# Patient Record
Sex: MALE | Race: BLACK OR AFRICAN AMERICAN | NOT HISPANIC OR LATINO | Employment: FULL TIME | URBAN - METROPOLITAN AREA
[De-identification: names, ages, dates, MRNs, and addresses within clinical notes are randomized per-mention and may not be internally consistent; named-entity substitution may affect disease eponyms.]

---

## 2023-08-16 ENCOUNTER — HOSPITAL ENCOUNTER (EMERGENCY)
Facility: CLINIC | Age: 63
Discharge: HOME | End: 2023-08-16
Attending: EMERGENCY MEDICINE | Admitting: EMERGENCY MEDICINE
Payer: COMMERCIAL

## 2023-08-16 VITALS
SYSTOLIC BLOOD PRESSURE: 158 MMHG | DIASTOLIC BLOOD PRESSURE: 99 MMHG | HEART RATE: 67 BPM | RESPIRATION RATE: 18 BRPM | OXYGEN SATURATION: 98 % | BODY MASS INDEX: 24.38 KG/M2 | TEMPERATURE: 97.9 F | HEIGHT: 72 IN | WEIGHT: 180 LBS

## 2023-08-16 DIAGNOSIS — A53.9 SYPHILIS: ICD-10-CM

## 2023-08-16 PROCEDURE — 99284 EMERGENCY DEPT VISIT MOD MDM: CPT | Performed by: EMERGENCY MEDICINE

## 2023-08-16 PROCEDURE — 250N000011 HC RX IP 250 OP 636: Performed by: EMERGENCY MEDICINE

## 2023-08-16 PROCEDURE — 96372 THER/PROPH/DIAG INJ SC/IM: CPT | Performed by: EMERGENCY MEDICINE

## 2023-08-16 RX ADMIN — PENICILLIN G BENZATHINE 2.4 MILLION UNITS: 2400000 INJECTION, SUSPENSION INTRAMUSCULAR at 01:45

## 2023-08-16 ASSESSMENT — ACTIVITIES OF DAILY LIVING (ADL): ADLS_ACUITY_SCORE: 33

## 2023-08-16 NOTE — ED TRIAGE NOTES
Patient reported he was diagnosed with Syphilis in March 2022. He's supposed to received 3 antibiotic shots but he missed the last dose. Patient said he noticed the symptoms recurring. Pt c/o pain to right upper arm up to his armpit, Tingling to bilateral hand/fingers. Patient also reported non healing sores on bilateral legs. He said he went to Urgent care yesterday but he was told they were at capacity.      Triage Assessment       Row Name 08/16/23 0046       Triage Assessment (Adult)    Airway WDL WDL       Respiratory WDL    Respiratory WDL WDL       Skin Circulation/Temperature WDL    Skin Circulation/Temperature WDL X  pt reported not healing sores on bilateral leg       Cardiac WDL    Cardiac WDL WDL       Peripheral/Neurovascular WDL    Peripheral Neurovascular WDL X;neurovascular assessment upper       LUE Neurovascular Assessment    Temperature LUE warm    Color LUE no discoloration    Sensation LUE tingling present       RUE Neurovascular Assessment    Temperature RUE warm    Color RUE no discoloration    Sensation RUE tingling present       Cognitive/Neuro/Behavioral WDL    Cognitive/Neuro/Behavioral WDL WDL

## 2023-08-16 NOTE — ED PROVIDER NOTES
[facility] [place] ([facility])    8/16/23          History     Chief Complaint   Patient presents with    Exposure to STD     HPI  Ryan Ren is a 63 year old male who with PMH of emphysema, HTN, syphilis, prediabetes, and Stage 3b CKD presents to the ED for STD.   [facility]. He received penicillin shots initially but did not complete a 3-week treatment course.. Patient has been having recurring symptoms , he was advised to go to the ED to restart the 3-week series of penicillin. Patient has been having sores and aches in his arms.  Symptoms are similar when he had his previous diagnosis of syphilis  Past Medical History  Past Medical History:   Diagnosis Date    Syphilis      History reviewed. No pertinent surgical history.  No current outpatient medications on file.    No Known Allergies  Family History  History reviewed. No pertinent family history.  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 No additional pertinent items.      A complete review of systems was performed with pertinent positives and negatives noted in the HPI, and all other systems negative.    Physical Exam   BP: (!) 158/99  Pulse: 67  Temp: 97.9  F (36.6  C)  Resp: 18  Height: 182.9 cm (6')  Weight: 81.6 kg (180 lb)  SpO2: 98 %  Physical Exam  Vitals reviewed.   Constitutional:       General: He is not in acute distress.     Appearance: He is not diaphoretic.   HENT:      Head: Normocephalic and atraumatic.      Right Ear: External ear normal.      Left Ear: External ear normal.      Nose: Nose normal. No rhinorrhea.      Mouth/Throat:       Mouth: Mucous membranes are moist.   Eyes:      General: No scleral icterus.     Extraocular Movements: Extraocular movements intact.      Conjunctiva/sclera: Conjunctivae normal.   Cardiovascular:      Rate and Rhythm: Normal rate and regular rhythm.      Heart sounds: Normal heart sounds.   Pulmonary:      Effort: No respiratory distress.      Breath sounds: Normal breath sounds.   Abdominal:      General: Bowel sounds are normal.      Palpations: Abdomen is soft.      Tenderness: There is no abdominal tenderness.   Musculoskeletal:         General: No deformity. Normal range of motion.      Cervical back: Normal range of motion and neck supple.   Skin:     General: Skin is warm.      Findings: No rash.   Neurological:      Mental Status: Mental status is at baseline.   Psychiatric:         Mood and Affect: Mood normal.         Behavior: Behavior normal.       ED Course, Procedures, & Data    1:10 AM  The patient was seen and examined by Keny Soriano DO.  in Room ED 02.   Procedures             No results found for any visits on 08/16/23.  Medications   Penicillin G Benzathine (BICILLIN L-A) injection 2.4 Million Units (has no administration in time range)     Labs Ordered and Resulted from Time of ED Arrival to Time of ED Departure - No data to display  No orders to display          Medical Decision Making  The patient's presentation is strongly suggestive of moderate complexity (a chronic illness mild to moderate exacerbation, progression, or side effect of treatment).    The patient's evaluation involved:  review of external note(s) from 3+ sources (Most recent H&P in addition to clinic and ED note)  review of 2 test result(s) ordered prior to this encounter (Most recent BMP and CBC)    The patient's management involved moderate risk (prescription drug management including medications given in the ED).    Assessment & Plan    63-year-old male presents to us with chief complaint of concerns for  recurrent syphilis.  As he did not complete the antibiotic course that he started a year ago we will reinstitute it today.  Patient can follow-up then at primary care office for the additional doses of penicillin.    I have reviewed the nursing notes. I have reviewed the findings, diagnosis, plan and need for follow up with the patient.    New Prescriptions    No medications on file       Final diagnoses:   Syphilis   I, FRANK CHRISTINE, am serving as a trained medical scribe to document services personally performed by Keny Soriano DO, based on the provider's statements to me.     I, Keny Soriano DO, was physically present and have reviewed and verified the accuracy of this note documented by FRANK CHRISTINE.     Keny Soriano DO.   [facility] EMERGENCY DEPARTMENT  8/16/2023     Keny Soriano DO  08/16/23 0143

## 2023-08-16 NOTE — DISCHARGE INSTRUCTIONS
Follow-up with your primary care provider.  You will need an additional shot of penicillin in 1  and 2 weeks. return to the emergency department as needed for any new or worsening symptoms.

## 2023-11-19 ENCOUNTER — HOSPITAL ENCOUNTER (EMERGENCY)
Facility: CLINIC | Age: 63
Discharge: HOME | End: 2023-11-19
Attending: EMERGENCY MEDICINE | Admitting: EMERGENCY MEDICINE
Payer: COMMERCIAL

## 2023-11-19 VITALS
SYSTOLIC BLOOD PRESSURE: 138 MMHG | DIASTOLIC BLOOD PRESSURE: 82 MMHG | RESPIRATION RATE: 18 BRPM | OXYGEN SATURATION: 92 % | HEART RATE: 87 BPM | TEMPERATURE: 99.4 F

## 2023-11-19 DIAGNOSIS — J06.9 UPPER RESPIRATORY TRACT INFECTION, UNSPECIFIED TYPE: ICD-10-CM

## 2023-11-19 LAB
FLUAV RNA SPEC QL NAA+PROBE: NEGATIVE
FLUBV RNA RESP QL NAA+PROBE: NEGATIVE
RSV RNA SPEC NAA+PROBE: NEGATIVE
SARS-COV-2 RNA RESP QL NAA+PROBE: NEGATIVE

## 2023-11-19 PROCEDURE — 99283 EMERGENCY DEPT VISIT LOW MDM: CPT | Performed by: EMERGENCY MEDICINE

## 2023-11-19 PROCEDURE — 87637 SARSCOV2&INF A&B&RSV AMP PRB: CPT | Performed by: EMERGENCY MEDICINE

## 2023-11-19 RX ORDER — ACETAMINOPHEN 325 MG/1
975 TABLET ORAL ONCE
Status: DISCONTINUED | OUTPATIENT
Start: 2023-11-19 | End: 2023-11-19 | Stop reason: HOSPADM

## 2023-11-19 RX ORDER — KETOROLAC TROMETHAMINE 15 MG/ML
10 INJECTION, SOLUTION INTRAMUSCULAR; INTRAVENOUS ONCE
Status: DISCONTINUED | OUTPATIENT
Start: 2023-11-19 | End: 2023-11-19 | Stop reason: HOSPADM

## 2023-11-19 NOTE — ED TRIAGE NOTES
Comes in with cold symptoms that started yesterday, was COVID negative on Friday. Pt now feels SOB. Denies CP. Diarrhea. Intermittent right sided abd pain.

## 2023-11-20 NOTE — ED NOTES
"Pt is refusing any further labs, interventions, wants to leave. Pt does not want to sit in hallway, stated \"I'll be paying for a room when I'm just sitting in the hallway in a wheelchair\" Risks vs benefits explained, MD notified who said she will talk to pt.   "

## 2023-11-20 NOTE — DISCHARGE INSTRUCTIONS
Please make an appointment to follow up with Your Primary Care Provider as soon as possible.    At this time you are choosing to leave AGAINST MEDICAL ADVICE as we have not yet ruled out emergent medical conditions including pneumonia, blood clot in your lungs, abdominal infection or other pathology that could potentially be life-threatening.  If at any point you wish to pursue further medical evaluation you are welcome to return to the emergency department at any time.

## 2023-11-20 NOTE — ED PROVIDER NOTES
ED Provider Note  [facility]      History     Chief Complaint   Patient presents with    Cold Symptoms    Shortness of Breath     HPI  Ryan Ren is a 63 year old male who is otherwise healthy who presents to the emergency department due to, headache, generalized myalgias, cough, rhinorrhea and diarrhea.  He reports that for the last few weeks he has had an ongoing nonproductive cough.  He denies any hemoptysis.  Yesterday he noted some discomfort in his right lower chest and felt feverish with chills.  He has developed a headache and some diarrhea and generalized malaise.  He does report feeling mildly short of breath.  Denies any abdominal pain or vomiting.  He denies any urinary symptoms.  He did take a home COVID test a few days ago which was negative.  He presents today due to ongoing symptoms.    This part of the medical record was transcribed by [employer], from a dictation done by Katelyn Valenzuela MD.     Past Medical History  Past Medical History:   Diagnosis Date    Syphilis      History reviewed. No pertinent surgical history.  No current outpatient medications on file.    No Known Allergies  Family History  No family history on file.  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 No additional pertinent items.      A complete review of systems was performed with pertinent positives and negatives noted in the HPI, and all other systems negative.    Physical Exam   BP: 138/82  Pulse: 87  Temp: 99.4  F (37.4  C)  Resp: 18  SpO2: 92 %  Physical Exam  General: patient is alert and oriented and in no acute distress   Head: atraumatic and  normocephalic   EENT: moist mucus membranes without tonsillar erythema or exudates, sclera anicteric  Neck: supple without meningismus  Cardiovascular: regular rate and rhythm, extremities warm and well perfused, no lower extremity edema  Pulmonary: lungs clear to auscultation bilaterally   Abdomen: soft, non-tender, no CVA tenderness to palpation  Musculoskeletal: normal range of motion   Neurological: alert and oriented, moving all extremities symmetrically, gait normal   Skin: warm, dry     ED Course, Procedures, & Data      Procedures                      No results found for any visits on 11/19/23.  Medications   sodium chloride 0.9% BOLUS 1,000 mL (has no administration in time range)   acetaminophen (TYLENOL) tablet 975 mg (has no administration in time range)   ketorolac (TORADOL) injection 10 mg (has no administration in time range)     Labs Ordered and Resulted from Time of ED Arrival to Time of ED Departure - No data to display  XR Chest 2 Views    (Results Pending)          Critical care was not performed.     Medical Decision Making  The patient's presentation was of moderate complexity (an acute illness with systemic symptoms).    The patient's evaluation involved:  review of external note(s) from 1 sources (PCP notes)  ordering and/or review of 1 test(s) in this encounter (see separate area of note for details)    The patient's management necessitated only low risk treatment.    Assessment & Plan    Mr. Ren is a 63-year-old male with a history of syphilis who presents to the emergency department due to URI symptoms and right lower chest pain.  He is hemodynamically stable, afebrile and in no respiratory distress.  Differential diagnosis includes but is not limited to COVID, influenza, pneumonia, PE, pneumothorax, biliary disease, hepatitis, pancreatitis, less likely ACS, UTI.  COVID and influenza swabs are negative.  Chest x-ray and laboratory work-up ordered however patient reports he does not wish  to pursue any further work-up at this time.  He reports he does not want to be seen in the hallway and wants to be in a room instead.  Unfortunately we do not have any rooms available at this time.  We did discuss that his evaluation would still continue even when in the hallway however he would like to discharge.  He reports he only wanted to know if his COVID was positive.  I did explain we have not yet ruled out other emergent pathology including other infection, PE etc. which could be life-threatening.  He is able to verbalize understanding and has capacity to make his own decision.  Will be discharged AGAINST MEDICAL ADVICE.  He does have a primary care provider and will plan to call his primary care provider to schedule an outpatient follow-up.  He was encouraged to return to the emergency department at any time if he wishes to pursue further care.    This part of the medical record was transcribed by [employer], Medical [employer]ribe, from a dictation done by Katelyn Palmer MD.     I have reviewed the nursing notes. I have reviewed the findings, diagnosis, plan and need for follow up with the patient.    New Prescriptions    No medications on file       Final diagnoses:   Upper respiratory tract infection, unspecified type       Katelyn Palmer MD  formerly [facility] EMERGENCY DEPARTMENT  11/19/2023     Katelyn Palmer MD  11/19/23 2019